# Patient Record
(demographics unavailable — no encounter records)

---

## 2024-10-09 NOTE — PHYSICAL EXAM
[NL] : no acute distress, alert [Alert] : alert [Soft] : soft [Tender] : tender [Distended] : nondistended [Normal Bowel Sounds] : normal bowel sounds [Hepatosplenomegaly] : hepatosplenomegaly [FreeTextEntry9] : suprapubic region the location of pain

## 2024-10-09 NOTE — HISTORY OF PRESENT ILLNESS
[de-identified] : cramps [FreeTextEntry6] : 16-year-old female presenting with dysmenorrhea  Patient reports menstrual period started today Pain 10/10 in the suprapubic region Patient reports nausea and vomiting which sometimes happens with her menses.  Patient denies any other concerns or complaints at this time

## 2024-10-09 NOTE — HISTORY OF PRESENT ILLNESS
[de-identified] : cramps [FreeTextEntry6] : 16-year-old female presenting with dysmenorrhea  Patient reports menstrual period started today Pain 10/10 in the suprapubic region Patient reports nausea and vomiting which sometimes happens with her menses.  Patient denies any other concerns or complaints at this time

## 2024-10-09 NOTE — DISCUSSION/SUMMARY
[FreeTextEntry1] : 16-year-old female presenting with dysmenorrhea  Dysmenorrhea -ibuprofen 400mg tab, x1, PO, NOW -patient allowed to rest in health center until symptoms improve

## 2024-10-23 NOTE — DISCUSSION/SUMMARY
[FreeTextEntry1] : 16-year-old female presenting for treatment of BV and Vaginal candida  -BD Affirm positive for Gardnerella vaginalis & Candida -Counseled on diagnosis.  -Clindamycin 2% cream, vaginal application x 7 days dispensed- Patient previous treated with oral metronidazole with no improvement in symptoms -Counseled regarding preventative measures - encouraged consistent condom use, abstaining from use of feminine hygiene products, scented sanitary products, detergents, and soaps, wearing cotton-lined underwear, wiping from front to back. -Abstain from sex until symptoms resolve.  -Return to clinic [facility] for persistent or worsening symptoms.

## 2024-10-23 NOTE — DISCUSSION/SUMMARY
[FreeTextEntry1] : 16-year-old female presenting with increased vaginal discharge, suprapubic pain and urinary stream that is not steady  Differential Diagnosis-UTI vs BV -UA ordered and negative -Urine culture ordered -BV panel ordered- patient self-collected -ibuprofen 400mg tab, x1, PO, NOW for pain- counseled to seek urgent care or ER with persistent worsening pain as patient is unable to go GYN due to insurance/payment issues -Will contact positive results for treatment.

## 2024-10-23 NOTE — HISTORY OF PRESENT ILLNESS
[de-identified] : treatment [FreeTextEntry6] : 16-year-old female presenting for treatment of BV and Candida after visit yesterday with vaginal complaints of discharge and itching.  Patient reports symptoms remain as discussed yesterday.

## 2024-10-23 NOTE — PHYSICAL EXAM
[NL] : no acute distress, alert [Soft] : soft [Tenderness with Palpation] : tenderness with palpation [FreeTextEntry9] : +TTP left and right suprapubic regions of abdomen

## 2024-10-23 NOTE — HISTORY OF PRESENT ILLNESS
[de-identified] : treatment [FreeTextEntry6] : 16-year-old female presenting for treatment of BV and Candida after visit yesterday with vaginal complaints of discharge and itching.  Patient reports symptoms remain as discussed yesterday.

## 2024-10-23 NOTE — REVIEW OF SYSTEMS
[Abdominal Pain] : abdominal pain [Vaginal Dischage] : vaginal discharge [Vaginal Itch] : vaginal itch [Dysuria] : no dysuria [Polyuria] : no polyuria [Vaginal Pain] : no vaginal pain

## 2024-10-23 NOTE — HISTORY OF PRESENT ILLNESS
[de-identified] : vaginal discharge [FreeTextEntry6] : 17 year old female with vaginal complaints after being treated for BV 1 month ago  Patient reports she completed the entire course of the metronidazole as prescribed but repots she did not have any improvement. Patient reports increased vaginal discharge that is about white/yellow and reports it is so excessive that it seeps through her clothes. Patient reports she notices a vaginal odor but cannot say exactly what it smells like.  -Patient reports she had sex while on the treatment and reports she feels her symptoms worsened after. -Patient reports she does experience itching at time. Denies any bumps or lesions.  Patient with complaints of suprapubic pain which she reports was on going since she was seen here about 1 month ago. Pain7/10. Patient reports she has not taken any medication for the pain. Patient denies any pain with urination, but repots she feels like the stream is spotty while voiding.  -Reports a history of the pain about 2 years ago that was due to a UTI that went to her kidneys -Also reports a previous occasion where she went to the ER and was told there was labia prolapse after intercourse with a female partner -Reports she has not seen a GYN due to cost issues with her mother's insurance  Last sex: ~2 weeks ago, female partner; patient did not use a condom and does not use condoms. LMP: ~ 10/10/24

## 2024-10-23 NOTE — HISTORY OF PRESENT ILLNESS
[de-identified] : vaginal discharge [FreeTextEntry6] : 17 year old female with vaginal complaints after being treated for BV 1 month ago  Patient reports she completed the entire course of the metronidazole as prescribed but repots she did not have any improvement. Patient reports increased vaginal discharge that is about white/yellow and reports it is so excessive that it seeps through her clothes. Patient reports she notices a vaginal odor but cannot say exactly what it smells like.  -Patient reports she had sex while on the treatment and reports she feels her symptoms worsened after. -Patient reports she does experience itching at time. Denies any bumps or lesions.  Patient with complaints of suprapubic pain which she reports was on going since she was seen here about 1 month ago. Pain7/10. Patient reports she has not taken any medication for the pain. Patient denies any pain with urination, but repots she feels like the stream is spotty while voiding.  -Reports a history of the pain about 2 years ago that was due to a UTI that went to her kidneys -Also reports a previous occasion where she went to the ER and was told there was labia prolapse after intercourse with a female partner -Reports she has not seen a GYN due to cost issues with her mother's insurance  Last sex: ~2 weeks ago, female partner; patient did not use a condom and does not use condoms. LMP: ~ 10/10/24

## 2024-10-23 NOTE — REVIEW OF SYSTEMS
[Dysuria] : no dysuria [Polyuria] : polyuria [Vaginal Dischage] : vaginal discharge [Vaginal Itch] : vaginal itch [Vaginal Pain] : no vaginal pain

## 2024-10-23 NOTE — HISTORY OF PRESENT ILLNESS
[de-identified] : vaginal discharge [FreeTextEntry6] : 17 year old female with vaginal complaints after being treated for BV 1 month ago  Patient reports she completed the entire course of the metronidazole as prescribed but repots she did not have any improvement. Patient reports increased vaginal discharge that is about white/yellow and reports it is so excessive that it seeps through her clothes. Patient reports she notices a vaginal odor but cannot say exactly what it smells like.  -Patient reports she had sex while on the treatment and reports she feels her symptoms worsened after. -Patient reports she does experience itching at time. Denies any bumps or lesions.  Patient with complaints of suprapubic pain which she reports was on going since she was seen here about 1 month ago. Pain7/10. Patient reports she has not taken any medication for the pain. Patient denies any pain with urination, but repots she feels like the stream is spotty while voiding.  -Reports a history of the pain about 2 years ago that was due to a UTI that went to her kidneys -Also reports a previous occasion where she went to the ER and was told there was labia prolapse after intercourse with a female partner -Reports she has not seen a GYN due to cost issues with her mother's insurance  Last sex: ~2 weeks ago, female partner; patient did not use a condom and does not use condoms. LMP: ~ 10/10/24

## 2024-11-08 NOTE — DISCUSSION/SUMMARY
[FreeTextEntry1] : 17 year old female presenting for dysmenorrhea and vaginitis.   1) Dysmenorrhea  -Given hot pack.  -Dispensed acetaminophen 325 mg 2 tabs po x 1.  -Counseled on pharmacological and nonpharmacological measures of pain relief.   2) Vaginitis  -Pt did not properly use clindamycin gel so infection may not have been fully treated.  -Return to health center for BD Affirm once bleeding stops if still symptomatic.  -Abstain from sex until symptoms resolve.

## 2024-11-08 NOTE — HISTORY OF PRESENT ILLNESS
[de-identified] : menstrual cramps  [FreeTextEntry6] : 17-year-old female presenting with menstrual cramps.   DLMP: 11/5/24. Pt complains of pain 8/10. Pt reports slight decrease in pain after using hot pack in waiting room. Pt typically takes acetaminophen with relief. Pt has not taken any pain medication since 11/6/24. Pt vomited in the bathroom at school earlier today.   Pt reports that she is still symptomatic for bacterial vaginosis with vaginal discharge and odor. Pt reports that she was last treated with Clindamycin Gel for bacterial vaginosis. Pt missed a few nights and did not take it consecutively. Pt also reports that she feels a lot of the gel leaked out.   No sexual activity since Mid-October 2024.   Pt denies any recent thoughts of suicide.   Pt is working on college applications. Pt was accepted to multiple [employer] colleges.

## 2024-11-08 NOTE — REVIEW OF SYSTEMS
[Abdominal Pain] : abdominal pain [Vaginal Dischage] : vaginal discharge [Negative] : Constitutional [FreeTextEntry1] : vaginal odor

## 2024-11-08 NOTE — PHYSICAL EXAM
[NL] : regular rate and rhythm, normal S1, S2 audible, no murmurs [Soft] : soft [Tender] : tender [Distended] : nondistended [Normal Bowel Sounds] : normal bowel sounds [Hepatosplenomegaly] : no hepatosplenomegaly [FreeTextEntry9] : + diffuse TTP

## 2024-11-20 NOTE — HISTORY OF PRESENT ILLNESS
[de-identified] : bacterial vaginosis  [FreeTextEntry6] : 17-year-old female presenting with symptoms of bacterial vaginosis.   Pt complains of vaginal itching, vaginal odor, and abnormal vaginal discharge in terms of the amount (white or yellow in color). Pt denies dysuria but has had dysuria recently.   Pt complains of abdominal pain for 2 or 3 days - mild pain today. Pt reports abdominal pain is intermittent. No vomiting. Pt reports that she had diarrhea one day ago - reports diarrhea was watery today. Pt reports loose stool this morning but not as watery as 1 day ago.  DLMP: 11/5/254  Pt had bacterial vaginosis in September 2024 and was treated with metronidazole but did not take it consistently. Pt had bacterial vaginosis in October 2024 and was treated with clindamycin gel but did not use it consistently.   Last sexual activity: September 2024 with female partner

## 2024-11-20 NOTE — PHYSICAL EXAM
[NL] : soft, nontender, nondistended, normal bowel sounds, no hepatosplenomegaly [Soft] : soft [Tender] : tender [Distended] : nondistended [Normal Bowel Sounds] : normal bowel sounds [Hepatosplenomegaly] : no hepatosplenomegaly [Rebound tenderness] : no rebound tenderness [Psoas Sign Positive] : psoas sign negative [Obturator Sign Positive] : obturator sign negative [FreeTextEntry9] : + TTP of lower right quadrant

## 2024-11-20 NOTE — REVIEW OF SYSTEMS
[Vaginal Dischage] : vaginal discharge [Vaginal Itch] : vaginal itch [Negative] : Constitutional [Abdominal Pain] : no abdominal pain

## 2024-11-20 NOTE — DISCUSSION/SUMMARY
[FreeTextEntry1] : 17-year-old female presenting with acute vaginitis and intermittent abdominal pain.   1) Acute Vaginitis  -Pt likely has bacterial vaginosis as prior treatments were not completed.  -Pt self-collected BD Affirm.  -Abstain from sex until test results.  -Will call with results.   2) Intermittent Abdominal Pain -Unclear etiology. No concern for appendicitis based on exam. Possibly due to recent diarrhea.  -Dispensed Ibuprofen 400 mg 1 tab po x 1 for pain.  -Advised bland diet. Encouraged increased fluids.  -Return to health center if pain persists or worsens and/or develops fever or vomiting.

## 2024-12-03 NOTE — REVIEW OF SYSTEMS
[Vaginal Dischage] : vaginal discharge [Vaginal Itch] : vaginal itch [Vaginal Pain] : vaginal pain [Dysuria] : no dysuria [Polyuria] : no polyuria

## 2024-12-03 NOTE — DISCUSSION/SUMMARY
[FreeTextEntry1] : 17-year-old female presenting with vaginal complaints  -Patient with complaints of persistent vaginitis symptoms -BD affirm collected by provider- No acute concern on assessment  -CT/GC ordered -Patient to return to health center tomorrow for results and treatment as needed -Patient unable to have GYN referral due to not having health insurance  Suprapubic pain -No acute concern on assessment, possibly pre-menstrual due to patient menstrual period coming due -Seek ER with any worsening symptoms -ibuprofen 400mg tab, x1, PO, NOW

## 2024-12-03 NOTE — HISTORY OF PRESENT ILLNESS
[de-identified] : vaginal complaints [FreeTextEntry6] : 17-year-old female presenting with complaints of vaginal pain and itching. Patient was treated for positive yeast with fluconazole two weeks ago.  Patient reports the symptoms kind of improved but has gotten worse as of recently.   Patient reports she took reports she took both doses of the fluconazole as directed Patient with complaints of +Discharge, odor and itching; also, with external vaginal pain and left lower suprapubic pain -Unsure how to describe the odor -Patient denies any bumps or lesions -Denies any changes in soaps or products used  DLMP: 11/5/24  Patient reports she is having left lower suprapubic pain that has been also alternative to the right lower suprapubic region and lower back. Abdominal pain 5/10. Denies dysuria, urinary frequency or any UTI symptoms. Reports because she always feels some degree of pain, she is unsure if it is related to her menstrual period  Last sexually active Sept. 2024 with female partner, reports she is currently celibate

## 2024-12-03 NOTE — PHYSICAL EXAM
[NL] : no acute distress, alert [Labial adhesions] : labial adhesions [Soft] : soft [Tender] : tender [Normal Bowel Sounds] : normal bowel sounds [Hepatosplenomegaly] : no hepatosplenomegaly [Splenomegaly] : no splenomegaly [Hepatomegaly] : no hepatomegaly [Mass] : no mass palpable [McBurney's point tenderness] : no McBurney's point tenderness [Rebound tenderness] : no rebound tenderness [Psoas Sign Positive] : psoas sign negative [Obturator Sign Positive] : obturator sign negative [Erythematous labia minora] : nonerythematous labia minora [Erythematous labia majora] : nonerythematous labia majora [Excoriations in perineum] : no excoriations in perineum [Vaginal discharge] : no vaginal discharge [FreeTextEntry9] : Right lower suprapubic abdomen (Patient reports it alternates between left and right region); negative guarding [FreeTextEntry6] : + tenderness with cotton swab to perineum. No increased discharge, or excoriation noted to labia's. No bumps or lesions noted as well.

## 2024-12-03 NOTE — HISTORY OF PRESENT ILLNESS
[de-identified] : vaginal complaints [FreeTextEntry6] : 17-year-old female presenting with complaints of vaginal pain and itching. Patient was treated for positive yeast with fluconazole two weeks ago.  Patient reports the symptoms kind of improved but has gotten worse as of recently.   Patient reports she took reports she took both doses of the fluconazole as directed Patient with complaints of +Discharge, odor and itching; also, with external vaginal pain and left lower suprapubic pain -Unsure how to describe the odor -Patient denies any bumps or lesions -Denies any changes in soaps or products used  DLMP: 11/5/24  Patient reports she is having left lower suprapubic pain that has been also alternative to the right lower suprapubic region and lower back. Abdominal pain 5/10. Denies dysuria, urinary frequency or any UTI symptoms. Reports because she always feels some degree of pain, she is unsure if it is related to her menstrual period  Last sexually active Sept. 2024 with female partner, reports she is currently celibate

## 2024-12-05 NOTE — DISCUSSION/SUMMARY
[FreeTextEntry1] : 17-year-old female presenting for treatment for persistent vaginal candida  Persistent vaginal candidia -Patient with persistant symptoms after treatment x2 in the past three months with fluconazole -Directed patient to use OTC Monistat 3- or 7-day treatment and return in one week for follow-up on improvement of symptoms  Emergency Contraception -Negative urine pregnancy test.  -Consent previously reviewed and signed.  -Dispensed one My Way for advanced use -Encouraged consistent condom use for STI prevention; condoms provided -Counseled on use of EC within 72 hours of unprotected sex, etc

## 2024-12-05 NOTE — HISTORY OF PRESENT ILLNESS
[de-identified] : follow-up [FreeTextEntry6] : 17-year-old female presenting for follow-up for treatment of vaginal candida,  Patient reports menses started 2 days ago and since then the itching has decreased but reports she knows it will come back.   Patient also requesting EC. Patient reports she does not currently have a male partner as she has not had a male partner in a long time. Reports she is maybe exploring if she is attracted to males so wants to have EC just in case. -Last had sex in Sept. 2024 with a female partner, reports she is currently celibate. -Not currently on any contraceptive

## 2025-01-28 NOTE — DISCUSSION/SUMMARY
[FreeTextEntry1] : Patient is 18yo female with abdominal pain on D#4 of menses with emesis Had lyle and egg sandwich from the store before school with cranberry juice ondansetron 4mg [id number] acetaminophen 650mg administered 11:10am warm pack provided

## 2025-01-28 NOTE — HISTORY OF PRESENT ILLNESS
[FreeTextEntry6] : Patient is 16yo female seen for dysmenorrhea with emesis x 2 (both in clinic) LMP 1/25/25 bkfst - sandwich lyle and egg from the deli with cranberry juice Generally does not have symptoms on day 4 of menses  pain is lower abdomen like dysmenorrhea but also diffuse abdominal pain no diarrhea  Also notes monistat did not resolve monilial vaginitits no sex with males since last visit

## 2025-01-28 NOTE — REVIEW OF SYSTEMS
[Appetite Changes] : no appetite changes [Vomiting] : vomiting [Diarrhea] : no diarrhea [Constipation] : no constipation [Abdominal Pain] : abdominal pain [Negative] : Respiratory

## 2025-02-05 NOTE — HISTORY OF PRESENT ILLNESS
[FreeTextEntry6] : Patient is 16yo female seen for f/u of yeast infection as she took fluconazole x 2 and monistat w/o benefit to symptoms Last used monistat 2 weeks ago Currently has vaginal pruritis, discharge, odor with thick disch white last sexual activity 3 months ago  LMP 1/26/25 last bled 1/31/25

## 2025-02-05 NOTE — PHYSICAL EXAM
[NL] : no acute distress, alert [FreeTextEntry6] : white discharge with minimal vulvar erythema lisha-introital

## 2025-02-05 NOTE — REVIEW OF SYSTEMS
[Dysuria] : dysuria [Vaginal Dischage] : vaginal discharge [Vaginal Itch] : vaginal itch [Negative] : Gastrointestinal

## 2025-02-05 NOTE — DISCUSSION/SUMMARY
[FreeTextEntry1] : Patient is 18yo female seen for pruritic discharge persistent despite rx for BV and multiple treatments for yeast Exam relatively normal today BD Affirm obtained Will return for results tomorrow or Friday

## 2025-03-10 NOTE — HISTORY OF PRESENT ILLNESS
[de-identified] : vaginitis  [FreeTextEntry6] : 17-year-old female presenting with abnormal vaginal itching, discharge, odor, and vaginal pain. Pt complains of significant discomfort.   Pt has a history of frequent bacterial vaginosis and yeast infections. Pt was advised at last visit to start suppressive therapy with Metronidazole 500 mg BID and Fluconazole 150 mg one week after last treatment but did not return to health center.   Pt reports that she was symptom free for 2-3 days after her last treatment and then symptoms returned. Pt had the longest relief with symptoms of bacterial vaginosis after treatment with Clindamycin Phosphate 2% vaginal cream.   Last sexual activity: 5 months ago, female partner. Pt reports symptoms are interfering with sexual activity.   Pt noted a significant change in appearance of labia following labial mass that began with abrupt onset of vaginal pain in 2023 - see [place] and [facility] notes.

## 2025-03-10 NOTE — HISTORY OF PRESENT ILLNESS
[de-identified] : vaginitis  [FreeTextEntry6] : 17-year-old female presenting with abnormal vaginal itching, discharge, odor, and vaginal pain. Pt complains of significant discomfort.   Pt has a history of frequent bacterial vaginosis and yeast infections. Pt was advised at last visit to start suppressive therapy with Metronidazole 500 mg BID and Fluconazole 150 mg one week after last treatment but did not return to health center.   Pt reports that she was symptom free for 2-3 days after her last treatment and then symptoms returned. Pt had the longest relief with symptoms of bacterial vaginosis after treatment with Clindamycin Phosphate 2% vaginal cream.   Last sexual activity: 5 months ago, female partner. Pt reports symptoms are interfering with sexual activity.   Pt noted a significant change in appearance of labia following labial mass that began with abrupt onset of vaginal pain in 2023 - see [facility] and [facility] notes.

## 2025-03-10 NOTE — HISTORY OF PRESENT ILLNESS
[de-identified] : vaginitis  [FreeTextEntry6] : 17-year-old female presenting with abnormal vaginal itching, discharge, odor, and vaginal pain. Pt complains of significant discomfort.   Pt has a history of frequent bacterial vaginosis and yeast infections. Pt was advised at last visit to start suppressive therapy with Metronidazole 500 mg BID and Fluconazole 150 mg one week after last treatment but did not return to health center.   Pt reports that she was symptom free for 2-3 days after her last treatment and then symptoms returned. Pt had the longest relief with symptoms of bacterial vaginosis after treatment with Clindamycin Phosphate 2% vaginal cream.   Last sexual activity: 5 months ago, female partner. Pt reports symptoms are interfering with sexual activity.   Pt noted a significant change in appearance of labia following labial mass that began with abrupt onset of vaginal pain in 2023 - see [place] and [facility] notes.

## 2025-03-10 NOTE — DISCUSSION/SUMMARY
[FreeTextEntry1] : 17-year-old female presenting with recurrent vaginitis. Pt tested positive on BD Affirm three times for Gardnerella Vaginalis and four times for Candida species in the past 6 months.   -HPI & exam consistent with yeast infection and bacterial vaginosis.  -Collected vaginitis panel via [facility].  -Will presumptively treat for yeast and bacterial vaginosis. Dispensed Fluconazole 150 mg 1 tab po x 1 now and 1 in 72 hours for yeast. Medication information given. Dispensed Clindamycin Phosphate 2% vaginal cream for bacterial vaginosis. Dispensed Clindamycin Phosphate 2% vaginal cream as pt reported this cream helped with symptoms more than metronidazole.  -Discussed plan for treatment for recurrent yeast infection - weekly fluconazole 150 mg given as directly observed therapy. Return on 3/13/25 to start suppressive therapy.  -Discussed plan for recurrent bacterial vaginosis - monthly metronidazole 2 g (along with fluconazole). Return on 3/20/25 to start suppressive therapy.  -Abstain from sex until symptoms resolve.  -Recommended referral to GYN for further evaluation especially in setting of change in appearance of labia after labial mass 2 years ago. Pt is currently uninsured. Will revisit getting health insurance with her mother.  -Will call with results.

## 2025-03-10 NOTE — PHYSICAL EXAM
[FreeTextEntry6] : Right labia minora > Left labia minor in length and width: no CMT; copious vaginal discharge, white to yellow in color, thick, adherent, malodorous, + pain with insertion of speculum in vaginal canal

## 2025-03-17 NOTE — DISCUSSION/SUMMARY
[FreeTextEntry1] : 17-year-old female presenting with recurrent vaginitis. Pt tested positive on BD Affirm three times for Gardnerella Vaginalis and four times for Candida species in the past 6 months. Vaginal panel via [facility] previously collected was canceled due to an error with specimen collection.   -Based on HPI and exam bacterial vaginosis improved. Symptoms of yeast infection are persistent.  -Excoriations in vulvar area likely due to yeast. Low suspicion for HSV but will rule it out. Collected HSV 1/2 swab. -POCT UA done given dysuria & urinary frequency: no nitrates, no leukocytes.  -Advised pt to complete course of Clindamycin vaginal cream 2%.  -Dispensed Fluconazole 150 mg 1 tab po  x 1. Medication information provided.  -Ordered HIV screening and screening for diabetes given frequent yeast infections.  -Recommended boric acid 600 mg capsules inserted in the vagina once daily for three weeks. Pt to purchase OTC.  -Counseled on vulvar and vaginal health and hygiene. Advised pt to abstain from using Honey Pot feminine care along with any other products marketed for feminine care. Use minimal use to clean outside of vulva. Use unscented, fragrance-free soap.  -Advised pt to pat dry after using the bathroom. Avoid itching. Wear loose fitting clothing.  -Abstain from sex until symptoms resolve.  -Will proceed with plan for treatment for recurrent yeast infection - weekly fluconazole 150 mg given as directly observed therapy. Will revised plan depending on the candida detected.  -Will proceed with plan for recurrent bacterial vaginosis - monthly metronidazole 2 g (along with fluconazole). Return on 3/20/25 to start suppressive therapy.  -Recommended referral to GYN for further evaluation especially in setting of change in appearance of labia after labial mass 2 years ago. Pt is currently uninsured. Requested that pt follow-up with her mother regarding the insurance.  -Will call with results.  -Return to [facility] on 3/20/25 for follow-up.

## 2025-03-17 NOTE — HISTORY OF PRESENT ILLNESS
[de-identified] : vaginitis  [FreeTextEntry6] : 17-year-old female presenting for follow-up of recurrent vaginitis.   Pt is currently using Clindamycin Phosphate 2% vaginal cream. Pt took two doses of Fluconazole 4 days apart (advised to take 3 days apart) last week and over the weekend.   Pt reports worsening symptoms since she began treatment. Pt complains of worsening vaginal itching and discharge. Pt is uncomfortable sitting. Pt is more comfortable walking. Pt is able to sleep okay. Pt reports vaginal odor has improved. Pt reports increased urinary frequency and dysuria. Pt reports vaginal discharge is yellow in color.   Pt reports that she had been using Honey Pot feminine care products. Pt discontinued use of these products last week.   Pt reports intermittent bleeding over the past two weeks but reports that she is not on her period.    Pt has a history of frequent bacterial vaginosis and yeast infections. Pt reports that she was symptom free for 2-3 days after her last treatment and then symptoms returned. Pt had the longest relief with symptoms of bacterial vaginosis after treatment with Clindamycin Phosphate 2% vaginal cream.   Last sexual activity: 5 months ago, female partner. Pt reports symptoms are interfering with sexual activity.   Pt noted a significant change in appearance of labia following labial mass that began with abrupt onset of vaginal pain in 2023 - see [place] and [facility] notes.

## 2025-03-17 NOTE — HISTORY OF PRESENT ILLNESS
[de-identified] : vaginitis  [FreeTextEntry6] : 17-year-old female presenting for follow-up of recurrent vaginitis.   Pt is currently using Clindamycin Phosphate 2% vaginal cream. Pt took two doses of Fluconazole 4 days apart (advised to take 3 days apart) last week and over the weekend.   Pt reports worsening symptoms since she began treatment. Pt complains of worsening vaginal itching and discharge. Pt is uncomfortable sitting. Pt is more comfortable walking. Pt is able to sleep okay. Pt reports vaginal odor has improved. Pt reports increased urinary frequency and dysuria. Pt reports vaginal discharge is yellow in color.   Pt reports that she had been using Honey Pot feminine care products. Pt discontinued use of these products last week.   Pt reports intermittent bleeding over the past two weeks but reports that she is not on her period.    Pt has a history of frequent bacterial vaginosis and yeast infections. Pt reports that she was symptom free for 2-3 days after her last treatment and then symptoms returned. Pt had the longest relief with symptoms of bacterial vaginosis after treatment with Clindamycin Phosphate 2% vaginal cream.   Last sexual activity: 5 months ago, female partner. Pt reports symptoms are interfering with sexual activity.   Pt noted a significant change in appearance of labia following labial mass that began with abrupt onset of vaginal pain in 2023 - see [facility] and [employer] notes.

## 2025-03-17 NOTE — PHYSICAL EXAM
[NL] : no acute distress, alert [Soft] : soft [Tender] : tender [Normal Bowel Sounds] : normal bowel sounds [Vaginal discharge] : vaginal discharge [Hepatosplenomegaly] : no hepatosplenomegaly [FreeTextEntry9] : mild diffuse TTP; no CVAT [FreeTextEntry6] : Right labia minora > Left labia minor in length and width; speculum exam not done; white discharge on labia, non-adherent; + excoriation below introitus, no vesicles; erythematous vulva

## 2025-03-17 NOTE — REVIEW OF SYSTEMS
[Dysuria] : dysuria [Vaginal Dischage] : vaginal discharge [Vaginal Itch] : vaginal itch [Negative] : Constitutional [Vomiting] : no vomiting [Abdominal Pain] : no abdominal pain

## 2025-03-17 NOTE — DISCUSSION/SUMMARY
[FreeTextEntry1] : 17-year-old female presenting with recurrent vaginitis. Pt tested positive on BD Affirm three times for Gardnerella Vaginalis and four times for Candida species in the past 6 months. Vaginal panel via [facility] previously collected was canceled due to an error with specimen collection.   -Based on HPI and exam bacterial vaginosis improved. Symptoms of yeast infection are persistent.  -Excoriations in vulvar area likely due to yeast. Low suspicion for HSV but will rule it out. Collected HSV 1/2 swab. -POCT UA done given dysuria & urinary frequency: no nitrates, no leukocytes.  -Advised pt to complete course of Clindamycin vaginal cream 2%.  -Dispensed Fluconazole 150 mg 1 tab po  x 1. Medication information provided.  -Ordered HIV screening and screening for diabetes given frequent yeast infections.  -Recommended boric acid 600 mg capsules inserted in the vagina once daily for three weeks. Pt to purchase OTC.  -Counseled on vulvar and vaginal health and hygiene. Advised pt to abstain from using Honey Pot feminine care along with any other products marketed for feminine care. Use minimal use to clean outside of vulva. Use unscented, fragrance-free soap.  -Advised pt to pat dry after using the bathroom. Avoid itching. Wear loose fitting clothing.  -Abstain from sex until symptoms resolve.  -Will proceed with plan for treatment for recurrent yeast infection - weekly fluconazole 150 mg given as directly observed therapy. Will revised plan depending on the candida detected.  -Will proceed with plan for recurrent bacterial vaginosis - monthly metronidazole 2 g (along with fluconazole). Return on 3/20/25 to start suppressive therapy.  -Recommended referral to GYN for further evaluation especially in setting of change in appearance of labia after labial mass 2 years ago. Pt is currently uninsured. Requested that pt follow-up with her mother regarding the insurance.  -Will call with results.  -Return to [facility] on 3/20/25 for follow-up.

## 2025-03-28 NOTE — DISCUSSION/SUMMARY
[FreeTextEntry1] : 17-year-old female presenting for follow-up with recurrent vaginitis. Pt tested positive for BD Affirm three times for Gardnerella Vaginalis and four times for Candida species in the past 6 months with ongoing symptoms.   Plan:  -Dispensed metronidazole 500 mg 4 tables po x 1 for suppressive therapy for bacterial vaginosis. Pt to return in 1 month to repeat treatment.  -Dispensed Fluconazole 150 mg - 1 tablet po x 1 for recurrent yeast infection. Pt to return in 1 week to repeat treatment.  -Continue with boric acid 600 mg vaginal suppository for 21 days - start once period finishes. Boric acid will cover for candida glabrata.  -Counseled on vaginal health and hygiene.  -Return to health center in 1 week.  -Will refer to pediatric gynecology once insured. Pt is working on getting health insurance through her employer.

## 2025-03-28 NOTE — REVIEW OF SYSTEMS
[Abdominal Pain] : abdominal pain [Irregular Vaginal Bleeding] : irregular vaginal bleeding [Vaginal Dischage] : vaginal discharge [Vaginal Itch] : vaginal itch [Negative] : Constitutional

## 2025-03-28 NOTE — HISTORY OF PRESENT ILLNESS
[de-identified] : recurrent yeast infection [FreeTextEntry6] : 17-year-old female with recurrent yeast infections and recurrent bacterial vaginosis presenting for follow-up.   Pt complains of vaginal itching 1 day ago. No itching today. Pt reports that she had a vaginal odor and discharge last week, which she describes as thick and white. Pt reports that it is difficult to tell if she still has odor as she is menstruating.   DLMP: 3/24/25. Pt denies any vaginal symptoms for the first three days of period. Pt complains of menstrual cramps but no vaginal itching, irritation, or odor.   Last sexual activity: 3/17/25, digital penetration with female partner. Pt reports that she had some bleeding after digital penetration.   Pt purchased boric acid vaginal suppository 600 mg. Pt used it 1 day but then got her period and wants to wait to complete course until period finishes.   Pt has been washing with Dove unscented soap.   Pt complains of pain 4/10 with menstrual cramps.
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
## 2025-04-10 NOTE — PHYSICAL EXAM
[NL] : regular rate and rhythm, normal S1, S2 audible, no murmurs [Soft] : soft [Tender] : tender [Tenderness] : no tenderness [Distended] : nondistended [Normal Bowel Sounds] : abnormal bowel sounds [Hepatosplenomegaly] : no hepatosplenomegaly [McBurney's point tenderness] : no McBurney's point tenderness [Rebound tenderness] : no rebound tenderness [Psoas Sign Positive] : psoas sign negative [Obturator Sign Positive] : obturator sign negative [FreeTextEntry5] : bruising below right eye  [FreeTextEntry9] : + TTP below umbilicus and to right of umbilicus; no guarding, negative Markle jar, able to hop without pain [de-identified] : + TTP of right lower paraspinal muscles
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
